# Patient Record
Sex: MALE | Race: BLACK OR AFRICAN AMERICAN | NOT HISPANIC OR LATINO | Employment: UNEMPLOYED | RURAL
[De-identification: names, ages, dates, MRNs, and addresses within clinical notes are randomized per-mention and may not be internally consistent; named-entity substitution may affect disease eponyms.]

---

## 2024-03-29 ENCOUNTER — HOSPITAL ENCOUNTER (EMERGENCY)
Facility: HOSPITAL | Age: 39
Discharge: HOME | End: 2024-03-29

## 2024-03-29 VITALS
SYSTOLIC BLOOD PRESSURE: 145 MMHG | HEART RATE: 105 BPM | OXYGEN SATURATION: 96 % | TEMPERATURE: 99 F | DIASTOLIC BLOOD PRESSURE: 91 MMHG | RESPIRATION RATE: 18 BRPM

## 2024-03-29 DIAGNOSIS — Z00.00 WELLNESS EXAMINATION: Primary | ICD-10-CM

## 2024-03-29 PROCEDURE — 99281 EMR DPT VST MAYX REQ PHY/QHP: CPT

## 2024-03-29 PROCEDURE — 99283 EMERGENCY DEPT VISIT LOW MDM: CPT | Mod: ,,, | Performed by: NURSE PRACTITIONER

## 2024-03-29 NOTE — Clinical Note
"Kassie"Zach Padgett was seen and treated in our emergency department on 3/29/2024.  He may return to work on 03/30/2024.       If you have any questions or concerns, please don't hesitate to call.      Zo Juan, MIGUELP"

## 2024-03-29 NOTE — ED TRIAGE NOTES
Patient states he was born male yet his identification states he is female, he states he needs something stating he is male for his job

## 2024-03-29 NOTE — ED PROVIDER NOTES
Encounter Date: 3/29/2024       History     Chief Complaint   Patient presents with    needs paperwork stating gender for work     38 year old TRINA presents to the ER requesting medical documentation that he is male.  He reports his drivers license and birth certificate say that he is a female but that he is a male.      The history is provided by the patient.     Review of patient's allergies indicates:  No Known Allergies  No past medical history on file.  No past surgical history on file.  No family history on file.     Review of Systems   Constitutional:  Negative for fever.   HENT:  Negative for sore throat.    Respiratory:  Negative for shortness of breath.    Cardiovascular:  Negative for chest pain.   Gastrointestinal:  Negative for nausea.   Genitourinary:  Negative for dysuria.   Musculoskeletal:  Negative for back pain.   Skin:  Negative for rash.   Neurological:  Negative for weakness.   Hematological:  Does not bruise/bleed easily.       Physical Exam     Initial Vitals [03/29/24 1300]   BP Pulse Resp Temp SpO2   (!) 145/91 105 18 98.5 °F (36.9 °C) 96 %      MAP       --         Physical Exam    Nursing note and vitals reviewed.  Constitutional: He appears well-developed and well-nourished. He is not diaphoretic. No distress.   HENT:   Head: Normocephalic and atraumatic.   Eyes: Pupils are equal, round, and reactive to light.   Neck: Neck supple.   Cardiovascular:  Normal rate, regular rhythm, normal heart sounds and intact distal pulses.     Exam reveals no gallop and no friction rub.       No murmur heard.  Pulmonary/Chest: Breath sounds normal. No respiratory distress. He has no wheezes. He has no rhonchi. He has no rales. He exhibits no tenderness.   Musculoskeletal:      Cervical back: Neck supple.     Neurological: He is alert and oriented to person, place, and time. GCS score is 15. GCS eye subscore is 4. GCS verbal subscore is 5. GCS motor subscore is 6.   Skin: Skin is warm and dry. Capillary  refill takes less than 2 seconds.   Psychiatric: He has a normal mood and affect.         Medical Screening Exam   See Full Note    ED Course   Procedures  Labs Reviewed - No data to display       Imaging Results    None          Medications - No data to display  Medical Decision Making                                    Clinical Impression:   Final diagnoses:  [Z00.00] Wellness examination (Primary)        ED Disposition Condition    Discharge Stable          ED Prescriptions    None       Follow-up Information       Follow up With Specialties Details Why Contact Info    [facility] [address] [address] [phone]             Zo Juan, FNP  03/29/24 1305

## 2024-05-08 ENCOUNTER — HOSPITAL ENCOUNTER (EMERGENCY)
Facility: HOSPITAL | Age: 39
Discharge: HOME | End: 2024-05-08

## 2024-05-08 VITALS
BODY MASS INDEX: 26.84 KG/M2 | SYSTOLIC BLOOD PRESSURE: 127 MMHG | HEART RATE: 89 BPM | TEMPERATURE: 99 F | DIASTOLIC BLOOD PRESSURE: 73 MMHG | HEIGHT: 66 IN | OXYGEN SATURATION: 98 % | WEIGHT: 167 LBS | RESPIRATION RATE: 18 BRPM

## 2024-05-08 DIAGNOSIS — R31.9 URINARY TRACT INFECTION WITH HEMATURIA, SITE UNSPECIFIED: ICD-10-CM

## 2024-05-08 DIAGNOSIS — R30.0 DYSURIA: Primary | ICD-10-CM

## 2024-05-08 DIAGNOSIS — R36.9 PENILE DISCHARGE: ICD-10-CM

## 2024-05-08 DIAGNOSIS — N39.0 URINARY TRACT INFECTION WITH HEMATURIA, SITE UNSPECIFIED: ICD-10-CM

## 2024-05-08 DIAGNOSIS — Z20.2 POSSIBLE EXPOSURE TO STD: ICD-10-CM

## 2024-05-08 LAB
BACTERIA #/AREA URNS HPF: ABNORMAL /HPF
BILIRUB UR QL STRIP: NEGATIVE
CLARITY UR: CLEAR
COLOR UR: YELLOW
GLUCOSE UR STRIP-MCNC: NEGATIVE MG/DL
KETONES UR STRIP-SCNC: NEGATIVE MG/DL
LEUKOCYTE ESTERASE UR QL STRIP: ABNORMAL
NITRITE UR QL STRIP: NEGATIVE
PH UR STRIP: 6 PH UNITS
PROT UR QL STRIP: NEGATIVE
RBC # UR STRIP: ABNORMAL /UL
RBC #/AREA URNS HPF: ABNORMAL /HPF
SP GR UR STRIP: >=1.03
SQUAMOUS #/AREA URNS LPF: ABNORMAL /LPF
UROBILINOGEN UR STRIP-ACNC: 1 MG/DL
WBC #/AREA URNS HPF: ABNORMAL /HPF

## 2024-05-08 PROCEDURE — 87591 N.GONORRHOEAE DNA AMP PROB: CPT

## 2024-05-08 PROCEDURE — 96372 THER/PROPH/DIAG INJ SC/IM: CPT

## 2024-05-08 PROCEDURE — 63600175 PHARM REV CODE 636 W HCPCS

## 2024-05-08 PROCEDURE — 99284 EMERGENCY DEPT VISIT MOD MDM: CPT | Mod: 25

## 2024-05-08 PROCEDURE — 87086 URINE CULTURE/COLONY COUNT: CPT

## 2024-05-08 PROCEDURE — 81001 URINALYSIS AUTO W/SCOPE: CPT

## 2024-05-08 PROCEDURE — 99284 EMERGENCY DEPT VISIT MOD MDM: CPT | Mod: ,,,

## 2024-05-08 RX ORDER — DOXYCYCLINE 100 MG/1
100 CAPSULE ORAL 2 TIMES DAILY
Qty: 20 CAPSULE | Refills: 0 | Status: SHIPPED | OUTPATIENT
Start: 2024-05-08 | End: 2024-05-18

## 2024-05-08 RX ORDER — CEFTRIAXONE 1 G/1
1 INJECTION, POWDER, FOR SOLUTION INTRAMUSCULAR; INTRAVENOUS
Status: COMPLETED | OUTPATIENT
Start: 2024-05-08 | End: 2024-05-08

## 2024-05-08 RX ADMIN — CEFTRIAXONE SODIUM 1 G: 1 INJECTION, POWDER, FOR SOLUTION INTRAMUSCULAR; INTRAVENOUS at 01:05

## 2024-05-08 NOTE — DISCHARGE INSTRUCTIONS
Take antibiotics as prescribed.  Avoid sexual contact until all results are available and preferably until you can be rechecked by your primary care provider or the Health Department.  We do recommend a follow up in 1-2 days for a recheck and to discuss further outpatient screenings as they feel necessary at that time.  Return to emerge department for any new or worrisome symptoms.

## 2024-05-08 NOTE — ED PROVIDER NOTES
Encounter Date: 5/8/2024       History     Chief Complaint   Patient presents with    Dysuria    Penile Discharge     X 2 days     Patient is a 38-year-old male who presents to emergency department with complaints of dysuria and penile discharge started today.  He does report a concern about STD exposure and is accompanied by his significant other.  He denies any fever, chills, weakness, dizziness, vomiting, diarrhea, or any other complaints.  His vital signs are stable.  He appears in no acute distress.    The history is provided by the patient.     Review of patient's allergies indicates:  No Known Allergies  History reviewed. No pertinent past medical history.  History reviewed. No pertinent surgical history.  No family history on file.  Social History     Tobacco Use    Smoking status: Every Day     Current packs/day: 0.50     Types: Cigarettes    Smokeless tobacco: Never   Substance Use Topics    Alcohol use: Never    Drug use: Never     Review of Systems   Constitutional:  Negative for activity change, appetite change, chills and fever.   HENT:  Negative for congestion, sore throat and trouble swallowing.    Eyes: Negative.    Respiratory:  Negative for cough and shortness of breath.    Cardiovascular:  Negative for chest pain and palpitations.   Gastrointestinal:  Negative for abdominal distention, abdominal pain, diarrhea, nausea and vomiting.   Endocrine: Negative.    Genitourinary:  Positive for dysuria and penile discharge. Negative for penile pain, penile swelling, scrotal swelling and testicular pain.   Musculoskeletal:  Negative for arthralgias, back pain, neck pain and neck stiffness.   Skin:  Negative for color change, pallor and rash.   Allergic/Immunologic: Negative.    Neurological:  Negative for dizziness, speech difficulty, weakness, numbness and headaches.   Hematological:  Does not bruise/bleed easily.   Psychiatric/Behavioral:  Negative for confusion. The patient is not nervous/anxious.     All other systems reviewed and are negative.      Physical Exam     Initial Vitals [05/08/24 1234]   BP Pulse Resp Temp SpO2   127/73 89 18 98.5 °F (36.9 °C) 98 %      MAP       --         Physical Exam    Nursing note and vitals reviewed.  Constitutional: He appears well-developed and well-nourished. He is not diaphoretic. No distress.   HENT:   Head: Normocephalic and atraumatic.   Mouth/Throat: Oropharynx is clear and moist.   Eyes: Conjunctivae and EOM are normal. Pupils are equal, round, and reactive to light.   Neck: Neck supple.   Normal range of motion.  Cardiovascular:  Normal rate, regular rhythm and normal heart sounds.           Pulmonary/Chest: Breath sounds normal. No respiratory distress. He has no wheezes. He has no rhonchi. He has no rales. He exhibits no tenderness.   Abdominal: Abdomen is soft. Bowel sounds are normal. He exhibits no distension. There is no abdominal tenderness. There is no rebound and no guarding.   Musculoskeletal:         General: Normal range of motion.      Cervical back: Normal range of motion and neck supple.     Neurological: He is alert and oriented to person, place, and time. He has normal strength. GCS score is 15. GCS eye subscore is 4. GCS verbal subscore is 5. GCS motor subscore is 6.   Skin: Skin is warm and dry. Capillary refill takes less than 2 seconds.   Psychiatric: He has a normal mood and affect. His behavior is normal. Judgment and thought content normal.         Medical Screening Exam   See Full Note    ED Course   Procedures  Labs Reviewed   URINALYSIS, REFLEX TO URINE CULTURE - Abnormal; Notable for the following components:       Result Value    Leukocytes, UA Small (*)     Blood, UA Trace-Intact (*)     Specific Gravity, UA >=1.030 (*)     All other components within normal limits   URINALYSIS, MICROSCOPIC - Abnormal; Notable for the following components:    WBC, UA 15-25 (*)     Bacteria, UA Moderate (*)     All other components within normal limits    CHLAMYDIA/GONORRHOEAE(GC), PCR   CULTURE, URINE          Imaging Results    None          Medications   cefTRIAXone injection 1 g (1 g Intramuscular Given 5/8/24 1310)     Medical Decision Making  Patient presents to the ED with complaints of dysuria, penile discharge, and possible STD exposure.  He was alert and oriented x4.  GCS 15.  Vital signs stable.  Afebrile with a temperature 98.5°.  Nontoxic in appearance.  Tolerating p.o. without any vomiting or diarrhea.  Urinalysis is indicative of a urinary tract infection and we will treat him with Rocephin 1 g IM here in the emergency department for UTI and STD prophylaxis.  We will follow with a 10 day course of doxycycline.  Urine culture sent and pending.  Gonorrhea/chlamydia also pending at the time of disposition.  He was instructed to take the antibiotics as directed and complete all antibiotics even if he feels better.  Also recommended to avoid sexual contact until he has completed all medication and performed a follow up with the primary care provider or the Health Department.  He was also informed that there other outpatient screenings that would likely need to be performed and that he should follow up in 1-2 days for a recheck and to discuss these treatment options.  Strict ED return precautions were explained in detail.  He verbalizes understanding and is in agreement with this plan.    Amount and/or Complexity of Data Reviewed  Independent Historian:      Details: Patient is a 38-year-old male who presents to emergency department with complaints of dysuria and penile discharge started today.  He does report a concern about STD exposure and is accompanied by his significant other.  He denies any fever, chills, weakness, dizziness, vomiting, diarrhea, or any other complaints.  His vital signs are stable.  He appears in no acute distress.  Labs: ordered.     Details: Urinalysis shows small leukocytes, trace blood, specific gravity greater than 1.030.   Microscopic urinalysis shows 15-25 WBCs, moderate bacteria.  Urine culture sent and pending.  Gonorrhea/chlamydia sent and pending.    Risk  Prescription drug management.  Risk Details: Patient presents for emergent evaluation of acute dysuria, penile discharge, and possible STD exposure that poses a threat to life and/or bodily function.    Final diagnoses:  [R30.0] Dysuria (Primary)  [R36.9] Penile discharge  [Z20.2] Possible exposure to STD  [N39.0, R31.9] Urinary tract infection with hematuria, site unspecified  I ordered labs and personally reviewed them.  Labs significant for UTI.  Culture and gonorrhea/chlamydia pending.  Patient was managed in the ED with Rocephin 1 g IM.    The response to treatment was improved.    Patient was discharged in stable condition.  Detailed return precautions discussed.                                       Clinical Impression:   Final diagnoses:  [R30.0] Dysuria (Primary)  [R36.9] Penile discharge  [Z20.2] Possible exposure to STD  [N39.0, R31.9] Urinary tract infection with hematuria, site unspecified        ED Disposition Condition    Discharge Stable          ED Prescriptions       Medication Sig Dispense Start Date End Date Auth. Provider    doxycycline (VIBRAMYCIN) 100 MG Cap Take 1 capsule (100 mg total) by mouth 2 (two) times daily. for 10 days 20 capsule 5/8/2024 5/18/2024 Chintan Yin FNP          Follow-up Information    None          Chintan Yin FNP  05/08/24 2412

## 2024-05-08 NOTE — Clinical Note
"Kassie Villavicencio"Lorin was seen and treated in our emergency department on 5/8/2024.  He may return to work on 05/09/2024.       If you have any questions or concerns, please don't hesitate to call.      Chintan Yin, FNP"

## 2024-05-09 LAB
CHLAMYDIA BY PCR: NEGATIVE
N. GONORRHOEAE (GC) BY PCR: POSITIVE

## 2024-05-10 LAB — UA COMPLETE W REFLEX CULTURE PNL UR: NO GROWTH

## 2025-03-06 ENCOUNTER — HOSPITAL ENCOUNTER (EMERGENCY)
Facility: HOSPITAL | Age: 40
Discharge: HOME | End: 2025-03-06

## 2025-03-06 VITALS
BODY MASS INDEX: 27.32 KG/M2 | WEIGHT: 170 LBS | OXYGEN SATURATION: 97 % | RESPIRATION RATE: 20 BRPM | HEIGHT: 66 IN | SYSTOLIC BLOOD PRESSURE: 105 MMHG | TEMPERATURE: 98 F | HEART RATE: 113 BPM | DIASTOLIC BLOOD PRESSURE: 88 MMHG
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
## 2025-03-06 DIAGNOSIS — M70.80 REPETITIVE MOTION INJURY: Primary | ICD-10-CM

## 2025-03-06 DIAGNOSIS — S61.011A: ICD-10-CM

## 2025-03-06 PROCEDURE — 99283 EMERGENCY DEPT VISIT LOW MDM: CPT | Mod: 25

## 2025-03-06 PROCEDURE — 99284 EMERGENCY DEPT VISIT MOD MDM: CPT | Mod: ,,, | Performed by: NURSE PRACTITIONER

## 2025-03-06 RX ORDER — CEPHALEXIN 500 MG/1
500 CAPSULE ORAL EVERY 8 HOURS
Qty: 21 CAPSULE | Refills: 0 | Status: SHIPPED | OUTPATIENT
Start: 2025-03-06 | End: 2025-03-13

## 2025-03-06 RX ORDER — IBUPROFEN 600 MG/1
600 TABLET ORAL EVERY 8 HOURS PRN
Qty: 21 TABLET | Refills: 0 | Status: SHIPPED | OUTPATIENT
Start: 2025-03-06

## 2025-03-06 NOTE — ED PROVIDER NOTES
Encounter Date: 3/6/2025       History     Chief Complaint   Patient presents with    Hand Pain     Pt reports rolanda hand pain with intermittent swelling.  Pt reports this has been going on about a week.  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  He reports he will have some mild swelling and pain to the hands.  He reports the right thumb got cut by a chicken bone, and he feels that area is more tender.  Denies fever, chills, sweats, drainage.     The history is provided by the patient.     Review of patient's allergies indicates:  No Known Allergies  History reviewed. No pertinent past medical history.  History reviewed. No pertinent surgical history.  No family history on file.  Social History[1]  Review of Systems   Constitutional:  Negative for fever.   HENT:  Negative for sore throat.    Respiratory:  Negative for shortness of breath.    Cardiovascular:  Negative for chest pain.   Gastrointestinal:  Negative for nausea.   Genitourinary:  Negative for dysuria.   Musculoskeletal:  Negative for back pain.   Skin:  Negative for rash.   Neurological:  Negative for weakness.   Hematological:  Does not bruise/bleed easily.       Physical Exam     Initial Vitals [03/06/25 1013]   BP Pulse Resp Temp SpO2   105/88 (!) 113 20 97.8 °F (36.6 °C) 97 %      MAP       --         Physical Exam    Nursing note and vitals reviewed.  Constitutional: He appears well-developed and well-nourished. He is not diaphoretic. He is cooperative.  Non-toxic appearance. He does not have a sickly appearance. He does not appear ill. No distress.   HENT:   Head: Normocephalic and atraumatic.   Eyes: Pupils are equal, round, and reactive to light.   Neck: Neck supple.   Cardiovascular:  Normal rate, regular rhythm, normal heart sounds and intact distal pulses.     Exam reveals no gallop and no friction rub.       No murmur heard.  Pulmonary/Chest: Breath sounds normal. No respiratory  distress. He has no wheezes. He has no rhonchi. He has no rales. He exhibits no tenderness.   Musculoskeletal:      Right hand: Tenderness and bony tenderness present. No swelling or deformity. Normal pulse.      Left hand: No swelling, deformity, tenderness or bony tenderness. Normal pulse.      Cervical back: Neck supple.      Comments: Tenderness over distal right thumb.  No open wound but he feels the cut was just under his nail.  Mild erythema. No drainage. No crepitus.       Neurological: He is alert and oriented to person, place, and time. GCS score is 15. GCS eye subscore is 4. GCS verbal subscore is 5. GCS motor subscore is 6.   Skin: Skin is warm and dry. Capillary refill takes less than 2 seconds.   Psychiatric: He has a normal mood and affect.         Medical Screening Exam   See Full Note    ED Course   Procedures  Labs Reviewed - No data to display       Imaging Results              X-Ray Hand 3 view Right (Preliminary result)  Result time 03/06/25 11:40:09      Wet Read by Zo Juan FNP (03/06/25 11:40:09, [facility] - Emergency Department, Emergency Medicine)    No acute fracture or dislocation noted, pending official radiological interpretation.                                       Medications - No data to display  Medical Decision Making  Problems Addressed:  Cut of skin of right thumb: self-limited or minor problem  Repetitive motion injury: self-limited or minor problem    Amount and/or Complexity of Data Reviewed  Radiology: ordered and independent interpretation performed. Decision-making details documented in ED Course.    Risk  Prescription drug management.               ED Course as of 03/06/25 1143   Thu Mar 06, 2025   1140 Xrays are delayed getting read.  No obvious fracture or dislocation noted on xray, pending official radiological interpretation.   [AG]   1141 Patient does not want to wait on imaging results.  I explained I wound given him a work excuse but he needed  to try to rest and avoid repetitive motion.  I will send in Ibuprofen for pain and keflex for the injury with erythema.  [AG]      ED Course User Index  [AG] Zo Juan FNP                           Clinical Impression:   Final diagnoses:  [M70.80, X50.3XXA] Repetitive motion injury (Primary)  [S61.011A] Cut of skin of right thumb        ED Disposition Condition    Discharge Stable          ED Prescriptions       Medication Sig Dispense Start Date End Date Auth. Provider    ibuprofen (ADVIL,MOTRIN) 600 MG tablet Take 1 tablet (600 mg total) by mouth every 8 (eight) hours as needed for Pain. 21 tablet 3/6/2025 -- Zo Juan FNP    cephALEXin (KEFLEX) 500 MG capsule Take 1 capsule (500 mg total) by mouth every 8 (eight) hours. for 7 days 21 capsule 3/6/2025 3/13/2025 Zo Juan FNP          Follow-up Information       Follow up With Specialties Details Why Contact Info    [place]  Schedule an appointment as soon as possible for a visit in 3 days  [address] [place] [id number] [phone]               [1]   Social History  Tobacco Use    Smoking status: Every Day     Current packs/day: 0.50     Types: Cigarettes    Smokeless tobacco: Never   Substance Use Topics    Alcohol use: Never    Drug use: Never        Zo Juan FNP  03/06/25 1149

## 2025-03-06 NOTE — Clinical Note
"Kassie Villavicencio"Lorin was seen and treated in our emergency department on 3/6/2025.  He may return to work on 03/09/2025.       If you have any questions or concerns, please don't hesitate to call.      Zo Juan, MIGUELP"

## 2025-03-06 NOTE — DISCHARGE INSTRUCTIONS
Avoid the same motion to allow your hands/wrists to heal.  Take medication as prescribed.  Follow-up with local family doctor or practitioner in 203 days for recheck.  I have given you a referral, in case you do not have one.

## 2025-03-06 NOTE — ED TRIAGE NOTES
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